# Patient Record
Sex: FEMALE | Race: WHITE | NOT HISPANIC OR LATINO
[De-identification: names, ages, dates, MRNs, and addresses within clinical notes are randomized per-mention and may not be internally consistent; named-entity substitution may affect disease eponyms.]

---

## 2017-11-07 PROBLEM — Z00.00 ENCOUNTER FOR PREVENTIVE HEALTH EXAMINATION: Status: ACTIVE | Noted: 2017-11-07

## 2017-11-11 ENCOUNTER — APPOINTMENT (OUTPATIENT)
Dept: OBGYN | Facility: CLINIC | Age: 22
End: 2017-11-11

## 2017-12-12 ENCOUNTER — APPOINTMENT (OUTPATIENT)
Dept: UROLOGY | Facility: CLINIC | Age: 22
End: 2017-12-12
Payer: MEDICAID

## 2017-12-12 DIAGNOSIS — Z80.3 FAMILY HISTORY OF MALIGNANT NEOPLASM OF BREAST: ICD-10-CM

## 2017-12-12 DIAGNOSIS — D35.2 BENIGN NEOPLASM OF PITUITARY GLAND: ICD-10-CM

## 2017-12-12 LAB
BILIRUB UR QL STRIP: NORMAL
GLUCOSE UR-MCNC: NORMAL
HCG UR QL: 1 EU/DL
HGB UR QL STRIP.AUTO: NORMAL
KETONES UR-MCNC: NORMAL
LEUKOCYTE ESTERASE UR QL STRIP: NORMAL
NITRITE UR QL STRIP: NORMAL
PH UR STRIP: 7
PROT UR STRIP-MCNC: NORMAL
SP GR UR STRIP: 1.02

## 2017-12-12 PROCEDURE — 51798 US URINE CAPACITY MEASURE: CPT

## 2017-12-12 PROCEDURE — 99204 OFFICE O/P NEW MOD 45 MIN: CPT

## 2017-12-13 PROBLEM — D35.2 MICROADENOMA: Status: RESOLVED | Noted: 2017-12-12 | Resolved: 2017-12-13

## 2017-12-13 RX ORDER — CABERGOLINE 0.5 MG/1
0.5 TABLET ORAL
Refills: 0 | Status: ACTIVE | COMMUNITY

## 2017-12-19 ENCOUNTER — APPOINTMENT (OUTPATIENT)
Dept: OBGYN | Facility: CLINIC | Age: 22
End: 2017-12-19
Payer: MEDICAID

## 2017-12-19 VITALS
SYSTOLIC BLOOD PRESSURE: 100 MMHG | DIASTOLIC BLOOD PRESSURE: 56 MMHG | HEIGHT: 63 IN | BODY MASS INDEX: 26.4 KG/M2 | WEIGHT: 149 LBS

## 2017-12-19 DIAGNOSIS — Z01.419 ENCOUNTER FOR GYNECOLOGICAL EXAMINATION (GENERAL) (ROUTINE) W/OUT ABNORMAL FINDINGS: ICD-10-CM

## 2017-12-19 DIAGNOSIS — D35.2 BENIGN NEOPLASM OF PITUITARY GLAND: ICD-10-CM

## 2017-12-19 DIAGNOSIS — Z78.9 OTHER SPECIFIED HEALTH STATUS: ICD-10-CM

## 2017-12-19 DIAGNOSIS — Z87.448 PERSONAL HISTORY OF OTHER DISEASES OF URINARY SYSTEM: ICD-10-CM

## 2017-12-19 DIAGNOSIS — E22.9 HYPERFUNCTION OF PITUITARY GLAND, UNSPECIFIED: ICD-10-CM

## 2017-12-19 DIAGNOSIS — Z83.2 FAMILY HISTORY OF DISEASES OF THE BLOOD AND BLOOD-FORMING ORGANS AND CERTAIN DISORDERS INVOLVING THE IMMUNE MECHANISM: ICD-10-CM

## 2017-12-19 PROCEDURE — 99385 PREV VISIT NEW AGE 18-39: CPT

## 2017-12-19 RX ORDER — CIPROFLOXACIN HYDROCHLORIDE 750 MG/1
TABLET, FILM COATED ORAL
Refills: 0 | Status: DISCONTINUED | COMMUNITY
End: 2017-12-19

## 2017-12-20 LAB
HBV SURFACE AG SER QL: NONREACTIVE
HCV AB SER QL: NONREACTIVE
HCV S/CO RATIO: 0.24 S/CO
T PALLIDUM AB SER QL IA: NEGATIVE

## 2017-12-21 PROBLEM — Z78.9 NON-SMOKER: Status: ACTIVE | Noted: 2017-12-21

## 2017-12-21 PROBLEM — Z78.9 SOCIAL ALCOHOL USE: Status: ACTIVE | Noted: 2017-12-21

## 2017-12-21 PROBLEM — E22.9 ELEVATED PROLACTIN LEVEL: Status: RESOLVED | Noted: 2017-12-21 | Resolved: 2017-12-21

## 2017-12-21 PROBLEM — Z78.9 DOES NOT USE ILLICIT DRUGS: Status: ACTIVE | Noted: 2017-12-21

## 2017-12-21 PROBLEM — Z01.419 WELL WOMAN EXAM WITH ROUTINE GYNECOLOGICAL EXAM: Status: ACTIVE | Noted: 2017-12-19

## 2017-12-21 PROBLEM — Z87.448 HISTORY OF URINARY URGENCY: Status: RESOLVED | Noted: 2017-12-12 | Resolved: 2017-12-21

## 2017-12-21 PROBLEM — D35.2 PROLACTINOMA: Status: ACTIVE | Noted: 2017-12-21

## 2017-12-21 PROBLEM — Z83.2 FAMILY HISTORY OF ANEMIA: Status: ACTIVE | Noted: 2017-12-21

## 2017-12-21 LAB
C TRACH RRNA SPEC QL NAA+PROBE: NOT DETECTED
HIV1+2 AB SPEC QL IA.RAPID: NONREACTIVE
N GONORRHOEA RRNA SPEC QL NAA+PROBE: NOT DETECTED
SOURCE AMPLIFICATION: NORMAL
SOURCE TP AMPLIFICATION: NORMAL
T VAGINALIS RRNA SPEC QL NAA+PROBE: NOT DETECTED

## 2017-12-23 LAB — CYTOLOGY CVX/VAG DOC THIN PREP: NORMAL

## 2018-01-05 ENCOUNTER — APPOINTMENT (OUTPATIENT)
Dept: UROLOGY | Facility: CLINIC | Age: 23
End: 2018-01-05

## 2018-11-19 ENCOUNTER — EMERGENCY (EMERGENCY)
Facility: HOSPITAL | Age: 23
LOS: 0 days | Discharge: HOME | End: 2018-11-19
Attending: STUDENT IN AN ORGANIZED HEALTH CARE EDUCATION/TRAINING PROGRAM | Admitting: STUDENT IN AN ORGANIZED HEALTH CARE EDUCATION/TRAINING PROGRAM
Payer: MEDICAID

## 2018-11-19 VITALS
TEMPERATURE: 97 F | OXYGEN SATURATION: 100 % | RESPIRATION RATE: 17 BRPM | HEART RATE: 105 BPM | SYSTOLIC BLOOD PRESSURE: 122 MMHG | DIASTOLIC BLOOD PRESSURE: 70 MMHG

## 2018-11-19 VITALS — HEIGHT: 63 IN | WEIGHT: 139.99 LBS

## 2018-11-19 DIAGNOSIS — R07.9 CHEST PAIN, UNSPECIFIED: ICD-10-CM

## 2018-11-19 LAB — HCG SERPL-ACNC: 21 MIU/ML — HIGH

## 2018-11-19 PROCEDURE — 93010 ELECTROCARDIOGRAM REPORT: CPT

## 2018-11-19 PROCEDURE — 99284 EMERGENCY DEPT VISIT MOD MDM: CPT

## 2018-11-19 PROCEDURE — 71046 X-RAY EXAM CHEST 2 VIEWS: CPT | Mod: 26

## 2018-11-19 RX ORDER — CABERGOLINE 0.5 MG/1
1 TABLET ORAL
Qty: 0 | Refills: 0 | COMMUNITY

## 2018-11-19 NOTE — ED ADULT NURSE NOTE - OBJECTIVE STATEMENT
Pt alert and oriented x3. Pt presents with chest pain that has subsided at this time. Pt states it lasted about an hour. Hx of 2 micro adenomas. Denies SOB

## 2018-11-19 NOTE — ED ADULT NURSE NOTE - NSIMPLEMENTINTERV_GEN_ALL_ED
Implemented All Universal Safety Interventions:  [place] to call system. Call bell, personal items and telephone within reach. Instruct patient to call for assistance. Room bathroom lighting operational. Non-slip footwear when patient is off stretcher. Physically safe environment: no spills, clutter or unnecessary equipment. Stretcher in lowest position, wheels locked, appropriate side rails in place.

## 2018-11-19 NOTE — ED PROVIDER NOTE - PROGRESS NOTE DETAILS
Kavin DO: Patient with no complaints at this time; positive bhcg- instructed to f/u with ob/gyn in 1-2 days without fail; strict return precautions given.

## 2018-11-19 NOTE — ED ADULT TRIAGE NOTE - CHIEF COMPLAINT QUOTE
pt c/o sudden chest pain while driving , pain is shart every 10 minutes, hx 2 micro adenoma , f/u with endocrinologist

## 2018-11-19 NOTE — ED PROVIDER NOTE - OBJECTIVE STATEMENT
24 y/o F with PMHx of Microadenoma x2 presenting to the ED c/o three episodes of CP today. Pt reports that she was driving to take a test at school when she experienced three separate episodes of sharp CP, 8/10 in severity, each lasting ~1 minute before resolving spontaneously. Describes sx as "cardiac thumping." +associated dizziness and palpitation. Endorses recent caffeine use. Pt currently asymptomatic while lying at rest in ED. Hx of palpitations, pt has been worked up by EP two years ago with unremarkable workup, per pt. Denies any fevers, chills, abd pain, N/V/D, numbness, weakness, or leg swelling. NKDA. 24 y/o F with PMHx of Brain Microadenoma x2 presenting to the ED c/o three episodes of CP today. Pt reports that she was driving to take a test at school when she experienced three separate episodes of sharp CP, 8/10 in severity, each lasting ~1 minute before resolving spontaneously. Describes sx as "cardiac thumping." +associated dizziness and palpitations. Endorses recent caffeine use. Pt currently asymptomatic while lying at rest in ED. Hx of palpitations, pt has been worked up by EP two years ago which was unremarkable, per pt. Denies any fevers, chills, abd pain, N/V/D, numbness, weakness, or leg swelling. NKDA.

## 2018-11-28 ENCOUNTER — EMERGENCY (EMERGENCY)
Facility: HOSPITAL | Age: 23
LOS: 1 days | Discharge: HOME | End: 2018-11-28
Attending: EMERGENCY MEDICINE
Payer: COMMERCIAL

## 2018-11-28 VITALS
SYSTOLIC BLOOD PRESSURE: 113 MMHG | OXYGEN SATURATION: 100 % | HEART RATE: 80 BPM | TEMPERATURE: 98 F | DIASTOLIC BLOOD PRESSURE: 69 MMHG | RESPIRATION RATE: 18 BRPM

## 2018-11-28 VITALS — WEIGHT: 145.06 LBS | HEIGHT: 63 IN

## 2018-11-28 PROBLEM — D35.2 BENIGN NEOPLASM OF PITUITARY GLAND: Chronic | Status: ACTIVE | Noted: 2018-11-19

## 2018-11-28 LAB
APPEARANCE UR: ABNORMAL
BILIRUB UR-MCNC: NEGATIVE — SIGNIFICANT CHANGE UP
COLOR SPEC: YELLOW — SIGNIFICANT CHANGE UP
COMMENT - URINE: SIGNIFICANT CHANGE UP
DIFF PNL FLD: ABNORMAL
EPI CELLS # UR: SIGNIFICANT CHANGE UP
GLUCOSE UR QL: NEGATIVE MG/DL — SIGNIFICANT CHANGE UP
HCG UR QL: NEGATIVE — SIGNIFICANT CHANGE UP
KETONES UR-MCNC: NEGATIVE — SIGNIFICANT CHANGE UP
LEUKOCYTE ESTERASE UR-ACNC: ABNORMAL
NITRITE UR-MCNC: NEGATIVE — SIGNIFICANT CHANGE UP
PH UR: 8 — SIGNIFICANT CHANGE UP (ref 5–8)
PROT UR-MCNC: 15 MG/DL
RBC CASTS # UR COMP ASSIST: >50 /HPF (ref 0–4)
SP GR SPEC: 1.01 — SIGNIFICANT CHANGE UP (ref 1.01–1.02)
UROBILINOGEN FLD QL: NEGATIVE MG/DL — SIGNIFICANT CHANGE UP
WBC UR QL: SIGNIFICANT CHANGE UP

## 2018-11-28 PROCEDURE — 81001 URINALYSIS AUTO W/SCOPE: CPT

## 2018-11-28 PROCEDURE — 99283 EMERGENCY DEPT VISIT LOW MDM: CPT

## 2018-11-28 PROCEDURE — 81025 URINE PREGNANCY TEST: CPT

## 2018-11-28 RX ORDER — SODIUM CHLORIDE 9 MG/ML
2000 INJECTION INTRAMUSCULAR; INTRAVENOUS; SUBCUTANEOUS ONCE
Qty: 0 | Refills: 0 | Status: DISCONTINUED | OUTPATIENT
Start: 2018-11-28 | End: 2018-12-03

## 2018-11-28 NOTE — ED PROVIDER NOTE - MEDICAL DECISION MAKING DETAILS
Possible menstrual cycle vs threatened ab, will obtain UCG, if positive will obtain blood work and US

## 2018-11-28 NOTE — ED PROVIDER NOTE - ATTENDING CONTRIBUTION TO CARE
pt states had + UCG   with vaginal bleeding and cramping   similar to her period   UCG negative    menses

## 2018-11-28 NOTE — ED PROVIDER NOTE - OBJECTIVE STATEMENT
22 yo F Pt, presents to ED complaining of vaginal bleeding x 1 day. Pt states she spotted 1 day ago and today she experienced bleeding and cramping similar to her period. LMP 10/28/18. Pt states she was seen in ED 9 days ago and told she had a positive HCG. Pt denies fever, chills, syncope, chest pain, SOB, abdominal pain, N/V/D, and any other acute symptoms at this time.

## 2018-11-28 NOTE — ED ADULT TRIAGE NOTE - CHIEF COMPLAINT QUOTE
Patient A/Ox3, states she found out last Monday she was pregnant and states she just miscarried.  Reports she went to the bathroom and voided lots of blood clots. Reports abdominal cramp.

## 2018-11-28 NOTE — ED STATDOCS - OBJECTIVE STATEMENT
Telemedicine assessment was conducted (using real time 2 way audio-video technology) by Dr. Herminia White located at [address] [place] [address]  ++++++++++++++++++++++++  Pertinent patient history and initial plan:   24 y/o F pt with hx of A0 presents to ED c/o urinating less than 1 hour ago today when she noticed blood and clots in urine, associated nausea, chills since. She is currently on her first pad. Pt notes being nervous secondary to symptoms. Denies any US since pregnancy or vomiting today.  LNMP: 10/27/18 Telemedicine assessment was conducted (using real time 2 way audio-video technology) by Dr. Herminia White located at [address] [place] [id number]  ++++++++++++++++++++++++  Pertinent patient history and initial plan:   22 y/o F pt A0 about 4 weeks pregnant, presents to ED vag bleeding with clots, 1 pad, x 1 hour, associated nausea, chills and lightheadedness since. She is currently on her first pad. Denies any US since pregnancy or vomiting or fever today.  LNMP: 10/27/18    On virtual and self exam pt noted to be tachy, +diffuse ttp on self exam.    Plan - ectopic vs spontaneous ab, will give IVF, check labs, TVUS

## 2018-11-28 NOTE — ED PROVIDER NOTE - PMH
Microadenoma    Near syncope    Pituitary adenoma    Transient cerebral ischemia, unspecified transient cerebral ischemia type

## 2018-11-30 ENCOUNTER — APPOINTMENT (OUTPATIENT)
Dept: OBGYN | Facility: CLINIC | Age: 23
End: 2018-11-30

## 2020-05-04 ENCOUNTER — TRANSCRIPTION ENCOUNTER (OUTPATIENT)
Age: 25
End: 2020-05-04

## 2020-07-31 ENCOUNTER — RESULT REVIEW (OUTPATIENT)
Age: 25
End: 2020-07-31

## 2020-08-07 ENCOUNTER — APPOINTMENT (OUTPATIENT)
Dept: OBGYN | Facility: CLINIC | Age: 25
End: 2020-08-07

## 2022-04-08 ENCOUNTER — APPOINTMENT (OUTPATIENT)
Dept: OBGYN | Facility: CLINIC | Age: 27
End: 2022-04-08

## 2022-10-11 NOTE — ED PROVIDER NOTE - NS ED MD DISPO DISCHARGE CCDA
Eye Clamp Note Details: An eye clamp was used during the procedure. Patient/Caregiver provided printed discharge information.

## 2022-12-05 ENCOUNTER — APPOINTMENT (OUTPATIENT)
Dept: SPINE | Facility: CLINIC | Age: 27
End: 2022-12-05

## 2022-12-05 ENCOUNTER — NON-APPOINTMENT (OUTPATIENT)
Age: 27
End: 2022-12-05

## 2022-12-05 VITALS
WEIGHT: 149 LBS | HEIGHT: 63 IN | SYSTOLIC BLOOD PRESSURE: 108 MMHG | HEART RATE: 76 BPM | DIASTOLIC BLOOD PRESSURE: 72 MMHG | OXYGEN SATURATION: 98 % | BODY MASS INDEX: 26.4 KG/M2

## 2022-12-05 DIAGNOSIS — D35.2 BENIGN NEOPLASM OF PITUITARY GLAND: ICD-10-CM

## 2022-12-05 PROCEDURE — 99204 OFFICE O/P NEW MOD 45 MIN: CPT

## 2023-10-09 ENCOUNTER — NON-APPOINTMENT (OUTPATIENT)
Age: 28
End: 2023-10-09

## 2023-11-14 ENCOUNTER — APPOINTMENT (OUTPATIENT)
Dept: OTOLARYNGOLOGY | Facility: CLINIC | Age: 28
End: 2023-11-14

## 2023-11-21 ENCOUNTER — APPOINTMENT (OUTPATIENT)
Dept: MRI IMAGING | Facility: CLINIC | Age: 28
End: 2023-11-21

## 2024-06-01 ENCOUNTER — NON-APPOINTMENT (OUTPATIENT)
Age: 29
End: 2024-06-01

## 2024-11-06 NOTE — ED ADULT NURSE NOTE - CHIEF COMPLAINT
Detail Level: Generalized Detail Level: Detailed The patient is a 23y Female complaining of pregnancy problem.

## 2025-06-23 NOTE — ED PROVIDER NOTE - MEDICAL DECISION MAKING DETAILS
PROCEDURE NOTE: 2nd Diagnostic Lumbar Medial Branch Block    PROCEDURE DATE: 6/23/2025    PATIENT NAME: Danitza Soto  YOB: 1978    ATTENDING PHYSICIAN: Molly Hanson MD   RESIDENT/FELLOW PHYSICIAN: None    PREOPERATIVE DIAGNOSIS:   Other, lumbar spondylosis  Lumbar facet pain  POSTOPERATIVE DIAGNOSIS: same    PROCEDURE PERFORMED: 2nd diagnostic medial branch block at the Bilateral L4, L5, and Sacral ala to treat the Bilateral  L4-5 and L5-S1 facet joints ( L3, L4 medial branch nerve blocks and L5 dorsal rami nerve blocks)      FLUOROSCOPY WAS USED.     INDICATIONS FOR PROCEDURE:   Danitza Soto is a 46 year old female with a clinical picture consistent with bilateral axial low back pain and lumbar facet arthropathy who presents for diagnostic medial branch block at the levels noted above to assess if there is a facetogenic component to her low back pain.     PROCEDURE AND FINDINGS:    Danitza was greeted in the pre-procedure holding area. The risk, benefits and alternatives to the procedure were again reviewed with the patient and written informed consent was placed in the chart. An IV line was not placed.  A 500 mL bag of NS was not connected to the patient. She was taken to the procedure room and positioned prone on the fluoroscopy table.  Routine monitors were applied including blood pressure cuff, and pulse oximetry. Prior to the procedure a time out was completed, verifying correct patient, procedure, site, positioning, and implants and/or special equipment.     The skin was prepped and draped in the usual sterile fashion. An AP fluoroscopic view was obtained to identify the vertebral bodies, the transverse processes and the superior articulating processes at the L4, L5, and Sacral ala aforementioned levels on the bilateral side(s). The skin overlying the junction of the TP-SAP at the aforementioned levels was anesthetized using a 27-gauge 1-1/4 inch needle with 1% preservative-free  lidocaine for a total volume of 0.5 ml per level. Next, a 25-gauge 3 1/2 inch Quincke spinal needle was advanced under an AP fluoroscopic view to the junction of the superior articular process and transverse process(es). Correct needle position was then confirmed with additional AP, ipsilateral oblique and/or lateral views.      After negative aspiration, 0.3 mls of Isovue-M contrast was injected at each site under live fluoroscopy; no vascular run off was identified and the contrast spread was adequate. At this point, after negative aspiration, 0.5mL of 2% Lidocaine, was injected at each site.  The needle was then re-styletted removed. The needle insertion site was dressed appropriately.     Danitza was taken to the recovery room where she was monitored for a brief period of time. She tolerated the procedure well and was discharged home in stable condition with post procedural instructions.    Before the procedure, she reported a pain score of 6/10.   After the procedure, she reported a pain score of 5/10 with difficulty distinguishing post-procedural discomfort.       Follow-up will be Determined after block sheet reviewed.    COMPLICATIONS: None    COMMENTS: None     24 y/o F with CP. 22 y/o F with CP. Plan: EKG, CXR, reassess.